# Patient Record
Sex: FEMALE | Employment: FULL TIME | URBAN - METROPOLITAN AREA
[De-identification: names, ages, dates, MRNs, and addresses within clinical notes are randomized per-mention and may not be internally consistent; named-entity substitution may affect disease eponyms.]

---

## 2017-02-10 ENCOUNTER — HOSPITAL ENCOUNTER (EMERGENCY)
Facility: CLINIC | Age: 22
Discharge: HOME | End: 2017-02-10
Attending: EMERGENCY MEDICINE | Admitting: EMERGENCY MEDICINE
Payer: COMMERCIAL

## 2017-02-10 ENCOUNTER — APPOINTMENT (OUTPATIENT)
Dept: GENERAL RADIOLOGY | Facility: CLINIC | Age: 22
End: 2017-02-10
Attending: EMERGENCY MEDICINE
Payer: COMMERCIAL

## 2017-02-10 VITALS
SYSTOLIC BLOOD PRESSURE: 151 MMHG | WEIGHT: 183 LBS | TEMPERATURE: 99 F | DIASTOLIC BLOOD PRESSURE: 67 MMHG | OXYGEN SATURATION: 98 % | HEIGHT: 62 IN | HEART RATE: 58 BPM | BODY MASS INDEX: 33.68 KG/M2 | RESPIRATION RATE: 18 BRPM

## 2017-02-10 DIAGNOSIS — S60.012A CONTUSION OF LEFT THUMB WITHOUT DAMAGE TO NAIL, INITIAL ENCOUNTER: ICD-10-CM

## 2017-02-10 PROCEDURE — 99283 EMERGENCY DEPT VISIT LOW MDM: CPT | Mod: 25

## 2017-02-10 PROCEDURE — 11740 EVACUATION SUBUNGUAL HMTMA: CPT | Mod: LT

## 2017-02-10 PROCEDURE — 73140 X-RAY EXAM OF FINGER(S): CPT | Mod: LT

## 2017-02-10 NOTE — ED AVS SNAPSHOT
Emergency Department    [address] [place] [address]    Phone:  [phone]    Fax:  [phone]                                       Mirian Vergara   MRN: [id number]    Department:   Emergency Department   Date of Visit:  2/10/2017           After Visit Summary Signature Page     I have received my discharge instructions, and my questions have been answered. I have discussed any challenges I see with this plan with the nurse or doctor.    ..........................................................................................................................................  Patient/Patient Representative Signature      ..........................................................................................................................................  Patient Representative Print Name and Relationship to Patient    ..................................................               ................................................  Date                                            Time    ..........................................................................................................................................  Reviewed by Signature/Title    ...................................................              ..............................................  Date                                                            Time

## 2017-02-10 NOTE — ED AVS SNAPSHOT
Emergency Department    [address] [facility] [address]    Phone:  [phone]    Fax:  [phone]                                       Mirian Vergara   MRN: [id number]    Department:   Emergency Department   Date of Visit:  2/10/2017           Patient Information     Date Of Birth          1995        Your diagnoses for this visit were:     Contusion of left thumb without damage to nail, initial encounter        You were seen by Mikal Clemons MD.      Follow-up Information     Follow up with Elsy Zuluaga MD.    Specialty:  Family Practice    Contact information:    [facility]  [address] [facility] [address]  [phone]          Discharge Instructions         Subungual Hematoma  A subungual hematoma is blood under the nail. It occurs when the finger or toe has been hit or crushed. It causes the nail to look blue. Sometimes, the bone under the nail may be fractured.    If the bruise is small and not too painful, it will heal without treatment. If the bruise is large and painful, the blood may need to be drained.  If a large area of the nail is damaged, your doctor may want to remove it. If the nail was not removed, it may become loose or fall off in the next 2 weeks. In almost all cases, the nail will grow back from the area under the cuticle called the matrix. This takes a few weeks to start and is complete in about 4 to 6 months for a fingernail and 12 months for a toenail. If the nail bed or matrix was damaged, the nail may grow back with a rough or irregular shape. Sometimes the nail may not regrow at all.  Home care  The following guidelines will help you care for your wound at home:    Apply an ice pack (ice cubes in a plastic bag, wrapped in a thin towel) for 20 minutes every 1 to 2 hours the first day. Continue this 3 to 4 times a day until the pain and swelling goes away.    If the nail was drained:    Keep the nail covered with a clean adhesive  bandage for the next 2 days. There may be some oozing of blood during that time, so change the bandage as needed.    Soak the finger or toe once a day under warm running water. Clean any crust away with a cotton-tipped applicator soaked in soapy water.    If the nail was removed:    The nail bed (tissue under the nail) is moist, soft and sensitive. This needs to be protected from injury for the first 7 to 10 days until it dries out and becomes hard. Keep it covered with a dressing or adhesive bandage until that time.    Bandages tend to stick to a newly exposed nail bed and can be hard to remove if left in place more than 24 hours. Therefore, unless you were told otherwise, change dressings every 24 hours. You can also use nonstick dressings. If necessary, soak the dressing off while holding your finger or toe under warm running water.    If an X-ray showed a fracture, you should protect the finger or toe for 3 to 4 weeks while it is healing.  Here is some information regarding medicine and your wound:    You can take acetaminophen or ibuprofen for pain, unless you were given a different pain medicine to use. If you have chronic liver or kidney disease or ever had a stomach ulcer or GI bleeding or are taking blood thinner medications, talk with your doctor before using these medicines.    If you were given antibiotics, take them until they are used up. It is important to finish the antibiotics even if the wound looks better. This will ensure the infection has cleared.  Follow-up care  Follow up with your doctor or this facility as advised. If the nail was drained, there is a small risk of infection. Watch carefully for the signs listed below.  When to get medical care  Get prompt medical attention if any of the following occur:    Increasing redness around the nail    Increasing local pain or swelling    Pus draining from the nail    Fever of 100.4 F (38 C) or higher, or as directed by your health care provider     1224-9739 [employer]. [address] [place] [address], [place] [address]. All rights reserved. This information is not intended as a substitute for professional medical care. Always follow your healthcare professional's instructions.          24 Hour Appointment Hotline       To make an appointment at any [facility], call [phone] ([phone]). If you don't have a family doctor or clinic, we will help you find one. [facility] are conveniently located to serve the needs of you and your family.             Review of your medicines      Our records show that you are taking the medicines listed below. If these are incorrect, please call your family doctor or clinic.        Dose / Directions Last dose taken    MIRENA (52 MG) IU        Refills:  0                Procedures and tests performed during your visit     Fingers XR, 2-3 views, left      Orders Needing Specimen Collection     None      Pending Results     No orders found from 2/9/2017 to 2/11/2017.            Pending Culture Results     No orders found from 2/9/2017 to 2/11/2017.       Test Results from your hospital stay           2/10/2017  8:17 PM - Interface, Radiant Ib      Narrative     LEFT THUMB 3 VIEWS   2/10/2017 8:00 PM     HISTORY: left thumb was slammed in car door; unable to bend thumb    COMPARISON: None.        Impression     IMPRESSION: Normal.    FREEMAN DIAZ MD                Clinical Quality Measure: Blood Pressure Screening     Your blood pressure was checked while you were in the emergency department today. The last reading we obtained was  BP: 151/67 mmHg . Please read the guidelines below about what these numbers mean and what you should do about them.  If your systolic blood pressure (the top number) is less than 120 and your diastolic blood pressure (the bottom number) is less than 80, then your blood pressure is normal. There is nothing more that you need to do about it.  If your systolic blood pressure (the  "top number) is 120-139 or your diastolic blood pressure (the bottom number) is 80-89, your blood pressure may be higher than it should be. You should have your blood pressure rechecked within a year by a primary care provider.  If your systolic blood pressure (the top number) is 140 or greater or your diastolic blood pressure (the bottom number) is 90 or greater, you may have high blood pressure. High blood pressure is treatable, but if left untreated over time it can put you at risk for heart attack, stroke, or kidney failure. You should have your blood pressure rechecked by a primary care provider within the next 4 weeks.  If your provider in the emergency department today gave you specific instructions to follow-up with your doctor or provider even sooner than that, you should follow that instruction and not wait for up to 4 weeks for your follow-up visit.        Thank you for choosing [facility]       Thank you for choosing [facility] for your care. Our goal is always to provide you with excellent care. Hearing back from our patients is one way we can continue to improve our services. Please take a few minutes to complete the written survey that you may receive in the mail after you visit with us. Thank you!        [employer] Information     [employer] lets you send messages to your doctor, view your test results, renew your prescriptions, schedule appointments and more. To sign up, go to [email or web address] . Click on \"Log in\" on the left side of the screen, which will take you to the Welcome page. Then click on \"Sign up Now\" on the right side of the page.     You will be asked to enter the access code listed below, as well as some personal information. Please follow the directions to create your username and password.     Your access code is: [email or web address]  Expires: 2017 10:38 PM     Your access code will  in 90 days. If you need help or a new code, please call your [facility] clinic or [phone].      "   Care EveryWhere ID     This is your Care EveryWhere ID. This could be used by other organizations to access your [facility] medical records  [id number]        After Visit Summary       This is your record. Keep this with you and show to your community pharmacist(s) and doctor(s) at your next visit.

## 2017-02-11 NOTE — DISCHARGE INSTRUCTIONS
Subungual Hematoma  A subungual hematoma is blood under the nail. It occurs when the finger or toe has been hit or crushed. It causes the nail to look blue. Sometimes, the bone under the nail may be fractured.    If the bruise is small and not too painful, it will heal without treatment. If the bruise is large and painful, the blood may need to be drained.  If a large area of the nail is damaged, your doctor may want to remove it. If the nail was not removed, it may become loose or fall off in the next 2 weeks. In almost all cases, the nail will grow back from the area under the cuticle called the matrix. This takes a few weeks to start and is complete in about 4 to 6 months for a fingernail and 12 months for a toenail. If the nail bed or matrix was damaged, the nail may grow back with a rough or irregular shape. Sometimes the nail may not regrow at all.  Home care  The following guidelines will help you care for your wound at home:    Apply an ice pack (ice cubes in a plastic bag, wrapped in a thin towel) for 20 minutes every 1 to 2 hours the first day. Continue this 3 to 4 times a day until the pain and swelling goes away.    If the nail was drained:    Keep the nail covered with a clean adhesive bandage for the next 2 days. There may be some oozing of blood during that time, so change the bandage as needed.    Soak the finger or toe once a day under warm running water. Clean any crust away with a cotton-tipped applicator soaked in soapy water.    If the nail was removed:    The nail bed (tissue under the nail) is moist, soft and sensitive. This needs to be protected from injury for the first 7 to 10 days until it dries out and becomes hard. Keep it covered with a dressing or adhesive bandage until that time.    Bandages tend to stick to a newly exposed nail bed and can be hard to remove if left in place more than 24 hours. Therefore, unless you were told otherwise, change dressings every 24 hours. You can also  use nonstick dressings. If necessary, soak the dressing off while holding your finger or toe under warm running water.    If an X-ray showed a fracture, you should protect the finger or toe for 3 to 4 weeks while it is healing.  Here is some information regarding medicine and your wound:    You can take acetaminophen or ibuprofen for pain, unless you were given a different pain medicine to use. If you have chronic liver or kidney disease or ever had a stomach ulcer or GI bleeding or are taking blood thinner medications, talk with your doctor before using these medicines.    If you were given antibiotics, take them until they are used up. It is important to finish the antibiotics even if the wound looks better. This will ensure the infection has cleared.  Follow-up care  Follow up with your doctor or this facility as advised. If the nail was drained, there is a small risk of infection. Watch carefully for the signs listed below.  When to get medical care  Get prompt medical attention if any of the following occur:    Increasing redness around the nail    Increasing local pain or swelling    Pus draining from the nail    Fever of 100.4 F (38 C) or higher, or as directed by your health care provider    5523-3241 [employer]. [address] [place] [address], [place] [address]. All rights reserved. This information is not intended as a substitute for professional medical care. Always follow your healthcare professional's instructions.

## 2017-02-11 NOTE — ED NOTES
Provided patient with a 2x2 gauze and tubegauze to left thumb following cautery procedure by provider. No complaints. Pre and post dressing CMS present and intact.   Bleeding controlled at this time.Patient understood the signs and symptoms of circulatory loss due to the tube gauze aplication and will take action to rectify   such loss, including but not limited to loosening the dressing and or taking it off.

## 2017-02-11 NOTE — ED PROVIDER NOTES
"  History     Chief Complaint:  Thumb pain    HPI   Mirian Vergara is a 21 year old female who presents to the emergency department today with thumb pain. 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 Pain increase with range of motion of the thumb at the DIP joint and she has limited range of motion at the DIP joint. Denies any other issues.     Allergies:  No Known Drug Allergies      Medications:    Mirena      Past Medical History:    History reviewed. No pertinent past medical history.      Past Surgical History:    History reviewed. No pertinent past surgical history.      Family History:    Diabetes - Mother - Maternal Grandfather   Hypertension - Father      Social History:  Smoking Status: Never smoker  Alcohol Use: No    Marital Status:  Single     Review of Systems   Skin:        Left thumb pain: Positive    All other systems reviewed and are negative.    Physical Exam   First Vitals:  BP: 151/67 mmHg  Pulse: 58  Temp: 99  F (37.2  C)  Resp: 18  Height: 157.5 cm (5' 2\")  Weight: 83.008 kg (183 lb)  SpO2: 98 %    Physical Exam  GENERAL: well developed, pleasant  HEAD: atraumatic  EYES: pupils reactive, extraocular muscles intact, conjunctivae normal  ENT:  mucus membranes moist  MUSCULOSKELETAL: Tenderness over the distal phalanx of the left thumb phalanx with a subungal hematoma.   SKIN: warm and dry, no acute rashes or ulceration  NEURO: GCS 15, cranial nerves intact, alert and oriented x3  PSYCH:  Mood/affect normal    Emergency Department Course     Imaging:  Radiology findings were communicated with the patient who voiced understanding of the findings.    Fingers XR, 2-3 views, left:  IMPRESSION: Normal.  Final reading per radiology     Procedures:  PROCEDURE: Nail trephination  Indication: subungal hematoma  The risks and benefits of the " procedure were discussed with the patient.   Narrative: The nail was cleaned with an alcohol swab.  An 18 gauge needle and a cautery was used to trephinate the nail.  A satisfactory amount of blood was expressed.  Pain was improved following the procedure.     Emergency Department Course:  Nursing notes and vitals reviewed.  I performed an exam of the patient as documented above.   I discussed the treatment plan with the patient. They expressed understanding of this plan and consented to discharge. They will be discharged home with instructions for care and follow up. In addition, the patient will return to the emergency department if their symptoms persist, worsen, if new symptoms arise or if there is any concern.  All questions were answered.     Impression & Plan      Medical Decision Making:  Mirian Vergara is a 21 year old female who presents to the emergency department today with subungual hematoma. It was initially trephinated by a 18 gauge needle followed by a nail trephinator followed by good outlet and relief of pain. A dressing was applied and discussed outpatient management. XR was negative.     Diagnosis:    ICD-10-CM    1. Contusion of left thumb without damage to nail, initial encounter S60.012A      Scribe Disclosure:  I, Pablo Barnett, am serving as a scribe at 9:55 PM on 2/10/2017 to document services personally performed by Mikal Clemons MD, based on my observations and the provider's statements to me.   2/10/2017    EMERGENCY DEPARTMENT         Mikal Clemons MD  02/12/17 2046

## 2017-08-26 ENCOUNTER — HEALTH MAINTENANCE LETTER (OUTPATIENT)
Age: 22
End: 2017-08-26

## 2018-07-16 ENCOUNTER — HEALTH MAINTENANCE LETTER (OUTPATIENT)
Age: 23
End: 2018-07-16

## 2018-11-05 ENCOUNTER — OFFICE VISIT (OUTPATIENT)
Dept: URGENT CARE | Facility: URGENT CARE | Age: 23
End: 2018-11-05

## 2018-11-05 VITALS
HEART RATE: 72 BPM | TEMPERATURE: 98.9 F | RESPIRATION RATE: 16 BRPM | DIASTOLIC BLOOD PRESSURE: 80 MMHG | SYSTOLIC BLOOD PRESSURE: 118 MMHG

## 2018-11-05 DIAGNOSIS — R05.8 PRODUCTIVE COUGH: Primary | ICD-10-CM

## 2018-11-05 DIAGNOSIS — R07.0 THROAT PAIN: ICD-10-CM

## 2018-11-05 LAB
DEPRECATED S PYO AG THROAT QL EIA: NORMAL
SPECIMEN SOURCE: NORMAL

## 2018-11-05 PROCEDURE — 99203 OFFICE O/P NEW LOW 30 MIN: CPT | Performed by: FAMILY MEDICINE

## 2018-11-05 PROCEDURE — 87880 STREP A ASSAY W/OPTIC: CPT | Performed by: FAMILY MEDICINE

## 2018-11-05 PROCEDURE — 87081 CULTURE SCREEN ONLY: CPT | Performed by: FAMILY MEDICINE

## 2018-11-05 RX ORDER — CODEINE PHOSPHATE AND GUAIFENESIN 10; 100 MG/5ML; MG/5ML
1 SOLUTION ORAL EVERY 4 HOURS PRN
Qty: 120 ML | Refills: 0 | Status: SHIPPED | OUTPATIENT
Start: 2018-11-05 | End: 2019-05-12

## 2018-11-05 NOTE — MR AVS SNAPSHOT
"              After Visit Summary   2018    Mirian Vergara    MRN: [id number]           Patient Information     Date Of Birth          1995        Visit Information        Provider Department      2018 12:30 PM Olinda Larry MD [facility]        Today's Diagnoses     Productive cough    -  1    Throat pain           Follow-ups after your visit        Who to contact     If you have questions or need follow up information about today's clinic visit or your schedule please contact [facility] directly at [phone].  Normal or non-critical lab and imaging results will be communicated to you by [employer], letter or phone within 4 business days after the clinic has received the results. If you do not hear from us within 7 days, please contact the clinic through [employer] or phone. If you have a critical or abnormal lab result, we will notify you by phone as soon as possible.  Submit refill requests through [employer] or call your pharmacy and they will forward the refill request to us. Please allow 3 business days for your refill to be completed.          Additional Information About Your Visit        MyChart Information     [employer] lets you send messages to your doctor, view your test results, renew your prescriptions, schedule appointments and more. To sign up, go to [email or web address] . Click on \"Log in\" on the left side of the screen, which will take you to the Welcome page. Then click on \"Sign up Now\" on the right side of the page.     You will be asked to enter the access code listed below, as well as some personal information. Please follow the directions to create your username and password.     Your access code is: [email or web address]  Expires: 2/3/2019 12:53 PM     Your access code will  in 90 days. If you need help or a new code, please call your [facility] clinic or [phone].        Care EveryWhere ID     This is your Care EveryWhere " ID. This could be used by other organizations to access your [facility] medical records  [id number]        Your Vitals Were     Pulse Temperature Respirations             72 98.9  F (37.2  C) (Oral) 16          Blood Pressure from Last 3 Encounters:   11/05/18 118/80   02/10/17 151/67   09/22/14 113/75    Weight from Last 3 Encounters:   02/10/17 183 lb (83 kg) (95 %)*   12/22/11 166 lb 2 oz (75.4 kg) (95 %)*     * Growth percentiles are based on [facility] 2-20 Years data.              We Performed the Following     Beta strep group A culture     Strep, Rapid Screen          Today's Medication Changes          These changes are accurate as of 11/5/18 12:53 PM.  If you have any questions, ask your nurse or doctor.               Start taking these medicines.        Dose/Directions    guaiFENesin-codeine 100-10 MG/5ML Soln solution   Commonly known as:  ROBITUSSIN AC   Used for:  Productive cough   Started by:  Olinda Larry MD        Dose:  1 tsp.   Take 5 mLs by mouth every 4 hours as needed for cough   Quantity:  120 mL   Refills:  0            Where to get your medicines      Some of these will need a paper prescription and others can be bought over the counter.  Ask your nurse if you have questions.     Bring a paper prescription for each of these medications     guaiFENesin-codeine 100-10 MG/5ML Soln solution                Primary Care Provider Office Phone # Fax #    Elsy Aminah Zuluaga [phone]       [facility] [address] [facility] [address]        Equal Access to Services     ALEJANDRO LAW AH: Hadii oral helms hadasho Soomaali, waaxda luqadaha, qaybta kaalmada adeegyada, santana dixon. So [facility] [phone].    ATENCIÓN: Si habla español, tiene a mayo disposición servicios gratuitos de asistencia lingüística. Llame al [phone].    We comply with applicable federal civil rights laws and [place] laws. We do not discriminate on the basis of race, color,  national origin, age, disability, sex, sexual orientation, or gender identity.            Thank you!     Thank you for choosing [facility]  for your care. Our goal is always to provide you with excellent care. Hearing back from our patients is one way we can continue to improve our services. Please take a few minutes to complete the written survey that you may receive in the mail after your visit with us. Thank you!             Your Updated Medication List - Protect others around you: Learn how to safely use, store and throw away your medicines at www.disposemymeds.org.          This list is accurate as of 11/5/18 12:53 PM.  Always use your most recent med list.                   Brand Name Dispense Instructions for use Diagnosis    guaiFENesin-codeine 100-10 MG/5ML Soln solution    ROBITUSSIN AC    120 mL    Take 5 mLs by mouth every 4 hours as needed for cough    Productive cough       MIRENA (52 MG) IU           sertraline 50 MG tablet    ZOLOFT     Take 50 mg by mouth daily

## 2018-11-05 NOTE — PROGRESS NOTES
SUBJECTIVE:   Mirian Vergara is a 23 year old female presenting with a chief complaint of runny nose, stuffy nose, cough - productive and sore throat. She is an established patient of [facility].  Onset of symptoms was 7 day(s) ago.  Course of illness is worsening.    Severity moderate  Current and Associated symptoms: cough - productive and sore throat  Treatment measures tried include OTC Cough med.  Predisposing factors include None.    No past medical history on file.  Current Outpatient Prescriptions   Medication Sig Dispense Refill     guaiFENesin-codeine (ROBITUSSIN AC) 100-10 MG/5ML SOLN solution Take 5 mLs by mouth every 4 hours as needed for cough 120 mL 0     Levonorgestrel (MIRENA, 52 MG, IU)        sertraline (ZOLOFT) 50 MG tablet Take 50 mg by mouth daily       Social History   Substance Use Topics     Smoking status: Never Smoker     Smokeless tobacco: Never Used     Alcohol use No     Family History   Problem Relation Age of Onset     Diabetes Mother      Hypertension Father      Diabetes Maternal Grandfather          ROS:    10 point ROS of systems including Constitutional, Eyes,  Cardiovascular, Gastroenterology, Genitourinary, Integumentary, Muscularskeletal, Psychiatric were all negative except for pertinent positives noted in my HPI         OBJECTIVE:  /80  Pulse 72  Temp 98.9  F (37.2  C) (Oral)  Resp 16  GENERAL APPEARANCE: healthy, alert and no distress  EYES: EOMI,  PERRL, conjunctiva clear  HENT: ear canals and TM's normal.  Nose and mouth without ulcers, erythema or lesions  NECK: supple, nontender, no lymphadenopathy  RESP: lungs clear to auscultation - no rales, rhonchi or wheezes  CV: regular rates and rhythm, normal S1 S2, no murmur noted  ABDOMEN:  soft, nontender, no HSM or masses and bowel sounds normal  SKIN: no suspicious lesions or rashes  Physical Exam      X-Ray was not done.      ASSESSMENT:  Mirian was seen today for urgent care.    Diagnoses and all orders for this  visit:    Productive cough  -     guaiFENesin-codeine (ROBITUSSIN AC) 100-10 MG/5ML SOLN solution; Take 5 mLs by mouth every 4 hours as needed for cough    Throat pain  -     Strep, Rapid Screen  -     Beta strep group A culture        Results for orders placed or performed in visit on 11/05/18   Strep, Rapid Screen   Result Value Ref Range    Specimen Description Throat     Rapid Strep A Screen       NEGATIVE: No Group A streptococcal antigen detected by immunoassay, await culture report.       PLAN:  Tylenol, Ibuprofen, Fluids, Rest, Saline gargles, Saline nasal spray and Vaporizer  See orders in Epic  Advised not to drive when taking the cough meds   Follow up if  symptoms fail to improve or worsens   Pt understood and agreed with plan     Olinda Larry MD

## 2018-11-06 LAB
BACTERIA SPEC CULT: NORMAL
SPECIMEN SOURCE: NORMAL

## 2019-05-10 ENCOUNTER — OFFICE VISIT (OUTPATIENT)
Dept: URGENT CARE | Facility: URGENT CARE | Age: 24
End: 2019-05-10

## 2019-05-10 ENCOUNTER — ANCILLARY PROCEDURE (OUTPATIENT)
Dept: GENERAL RADIOLOGY | Facility: CLINIC | Age: 24
End: 2019-05-10
Attending: FAMILY MEDICINE

## 2019-05-10 VITALS
TEMPERATURE: 102.3 F | DIASTOLIC BLOOD PRESSURE: 74 MMHG | OXYGEN SATURATION: 97 % | SYSTOLIC BLOOD PRESSURE: 118 MMHG | HEART RATE: 108 BPM | BODY MASS INDEX: 35.67 KG/M2 | RESPIRATION RATE: 16 BRPM | WEIGHT: 195 LBS

## 2019-05-10 DIAGNOSIS — R05.8 PRODUCTIVE COUGH: Primary | ICD-10-CM

## 2019-05-10 DIAGNOSIS — J02.9 SORETHROAT: ICD-10-CM

## 2019-05-10 DIAGNOSIS — R50.9 FEVER IN ADULT: ICD-10-CM

## 2019-05-10 DIAGNOSIS — J18.9 PNEUMONIA OF RIGHT LOWER LOBE DUE TO INFECTIOUS ORGANISM: ICD-10-CM

## 2019-05-10 LAB
DEPRECATED S PYO AG THROAT QL EIA: NORMAL
SPECIMEN SOURCE: NORMAL

## 2019-05-10 PROCEDURE — 87081 CULTURE SCREEN ONLY: CPT | Performed by: FAMILY MEDICINE

## 2019-05-10 PROCEDURE — 99214 OFFICE O/P EST MOD 30 MIN: CPT | Performed by: FAMILY MEDICINE

## 2019-05-10 PROCEDURE — 87880 STREP A ASSAY W/OPTIC: CPT | Performed by: FAMILY MEDICINE

## 2019-05-10 PROCEDURE — 71046 X-RAY EXAM CHEST 2 VIEWS: CPT

## 2019-05-10 RX ORDER — BENZONATATE 100 MG/1
100 CAPSULE ORAL 3 TIMES DAILY PRN
Qty: 30 CAPSULE | Refills: 0 | Status: SHIPPED | OUTPATIENT
Start: 2019-05-10

## 2019-05-10 RX ORDER — AZITHROMYCIN 250 MG/1
TABLET, FILM COATED ORAL
Qty: 6 TABLET | Refills: 0 | Status: SHIPPED | OUTPATIENT
Start: 2019-05-10 | End: 2019-05-15

## 2019-05-10 NOTE — PROGRESS NOTES
SUBJECTIVE:   Mirian Vergara is a 23 year old female with h/o inactive TB   presenting with a chief complaint of fever, chills, cough - productive and fatigue.and sorethroat  She is an established patient of [facility].  Onset of symptoms was 5 day(s) ago.  Course of illness is worsening.    Severity moderate  Current and Associated symptoms: fever, chills and cough - productive  Treatment measures tried include OTC Cough med.  Predisposing factors include recent illness it all started almost a week back with sore throat runny nose and fever then it resolved completely but then 5 days back she started spiking high fever with cough productive of mostly mucus.  And chills.    History reviewed. No pertinent past medical history.  Current Outpatient Medications   Medication Sig Dispense Refill     guaiFENesin-codeine (ROBITUSSIN AC) 100-10 MG/5ML SOLN solution Take 5 mLs by mouth every 4 hours as needed for cough (Patient not taking: Reported on 5/10/2019) 120 mL 0     Levonorgestrel (MIRENA, 52 MG, IU)        sertraline (ZOLOFT) 50 MG tablet Take 50 mg by mouth daily       Social History     Tobacco Use     Smoking status: Never Smoker     Smokeless tobacco: Never Used   Substance Use Topics     Alcohol use: No     Family History   Problem Relation Age of Onset     Diabetes Mother      Hypertension Father      Diabetes Maternal Grandfather          ROS:    10 point ROS of systems including Eyes,Cardiovascular, Gastroenterology, Genitourinary, Integumentary, Muscularskeletal, Psychiatric were all negative except for pertinent positives noted in my HPI         OBJECTIVE:  /74 (BP Location: Right arm, Patient Position: Sitting, Cuff Size: Adult Regular)   Pulse 108   Temp 102.3  F (39.1  C) (Oral)   Resp 16   Wt 88.5 kg (195 lb)   SpO2 97%   BMI 35.67 kg/m    GENERAL APPEARANCE: healthy, alert and no distress  EYES: EOMI,  PERRL, conjunctiva clear  HENT: ear canals and TM's normal.  Nose and mouth without  ulcers, erythema or lesions  NECK: supple, nontender, no lymphadenopathy  RESP: lungs positive for  rales, rhonchi or wheezes  CV: regular rates and rhythm, normal S1 S2, no murmur noted  ABDOMEN:  soft, nontender, no HSM or masses and bowel sounds normal  SKIN: no suspicious lesions or rashes  PSYCH: mentation appears normal  Physical Exam      X-Ray was done, my findings are:  Right lower lobe infiltrate noted    Medical Decision Making:    Differential Diagnosis:  URI Adult/Peds:  Bronchitis-viral, Influenza, Pneumonia, Strep pharyngitis, Viral pharyngitis, Viral syndrome and Viral upper respiratory illness      ASSESSMENT:  Mirian was seen today for fever and cough.    Diagnoses and all orders for this visit:    Productive cough  -     XR Chest 2 Views    Pneumonia of right lower lobe due to infectious organism (H)  -     azithromycin (ZITHROMAX) 250 MG tablet; Take 2 tablets (500 mg) by mouth daily for 1 day, THEN 1 tablet (250 mg) daily for 4 days.    Fever in adult  -     XR Chest 2 Views  -     Strep, Rapid Screen    Sorethroat  -     Strep, Rapid Screen          PLAN:  Tylenol, Fluids, Rest, Saline gargles, Saline nasal spray and Vaporizer  Will treat with azithromycin  Follow up if  symptoms fail to improve or worsens   Pt understood and agreed with plan   Pt was advised to follow up in ER if symptoms worsen     See orders in Epic

## 2019-05-11 LAB
BACTERIA SPEC CULT: NORMAL
SPECIMEN SOURCE: NORMAL

## 2019-05-12 ENCOUNTER — APPOINTMENT (OUTPATIENT)
Dept: GENERAL RADIOLOGY | Facility: CLINIC | Age: 24
End: 2019-05-12
Attending: EMERGENCY MEDICINE

## 2019-05-12 ENCOUNTER — HOSPITAL ENCOUNTER (EMERGENCY)
Facility: CLINIC | Age: 24
Discharge: HOME | End: 2019-05-13
Attending: EMERGENCY MEDICINE | Admitting: EMERGENCY MEDICINE

## 2019-05-12 DIAGNOSIS — R05.9 COUGH: ICD-10-CM

## 2019-05-12 PROCEDURE — 71046 X-RAY EXAM CHEST 2 VIEWS: CPT

## 2019-05-12 PROCEDURE — 99283 EMERGENCY DEPT VISIT LOW MDM: CPT | Mod: 25

## 2019-05-12 PROCEDURE — 25000125 ZZHC RX 250: Performed by: EMERGENCY MEDICINE

## 2019-05-12 PROCEDURE — 94640 AIRWAY INHALATION TREATMENT: CPT

## 2019-05-12 RX ORDER — ALBUTEROL SULFATE 90 UG/1
2 AEROSOL, METERED RESPIRATORY (INHALATION) EVERY 4 HOURS PRN
Qty: 6.7 G | Refills: 0 | Status: SHIPPED | OUTPATIENT
Start: 2019-05-12

## 2019-05-12 RX ORDER — DEXAMETHASONE SODIUM PHOSPHATE 10 MG/ML
16 INJECTION, SOLUTION INTRAMUSCULAR; INTRAVENOUS ONCE
Status: DISCONTINUED | OUTPATIENT
Start: 2019-05-12 | End: 2019-05-13 | Stop reason: HOSPADM

## 2019-05-12 RX ORDER — IPRATROPIUM BROMIDE AND ALBUTEROL SULFATE 2.5; .5 MG/3ML; MG/3ML
3 SOLUTION RESPIRATORY (INHALATION) ONCE
Status: COMPLETED | OUTPATIENT
Start: 2019-05-12 | End: 2019-05-12

## 2019-05-12 RX ORDER — CODEINE PHOSPHATE AND GUAIFENESIN 10; 100 MG/5ML; MG/5ML
1 SOLUTION ORAL EVERY 4 HOURS PRN
Qty: 118 ML | Refills: 0 | Status: SHIPPED | OUTPATIENT
Start: 2019-05-12

## 2019-05-12 RX ADMIN — IPRATROPIUM BROMIDE AND ALBUTEROL SULFATE 3 ML: .5; 3 SOLUTION RESPIRATORY (INHALATION) at 22:33

## 2019-05-12 ASSESSMENT — ENCOUNTER SYMPTOMS
RHINORRHEA: 1
COUGH: 1
DIARRHEA: 0
FEVER: 0
WHEEZING: 1

## 2019-05-12 ASSESSMENT — MIFFLIN-ST. JEOR: SCORE: 1602.76

## 2019-05-12 NOTE — ED AVS SNAPSHOT
Emergency Department  [address] [place] [id number]  Phone:  [phone]  Fax:  [phone]                                    Mirian Vergara   MRN: [id number]    Department:   Emergency Department   Date of Visit:  5/12/2019           After Visit Summary Signature Page    I have received my discharge instructions, and my questions have been answered. I have discussed any challenges I see with this plan with the nurse or doctor.    ..........................................................................................................................................  Patient/Patient Representative Signature      ..........................................................................................................................................  Patient Representative Print Name and Relationship to Patient    ..................................................               ................................................  Date                                   Time    ..........................................................................................................................................  Reviewed by Signature/Title    ...................................................              ..............................................  Date                                               Time          22EPIC Rev 08/18

## 2019-05-13 VITALS
WEIGHT: 195 LBS | BODY MASS INDEX: 35.88 KG/M2 | DIASTOLIC BLOOD PRESSURE: 70 MMHG | HEART RATE: 83 BPM | TEMPERATURE: 98.3 F | OXYGEN SATURATION: 98 % | RESPIRATION RATE: 20 BRPM | HEIGHT: 62 IN | SYSTOLIC BLOOD PRESSURE: 110 MMHG

## 2019-05-13 NOTE — DISCHARGE INSTRUCTIONS
Use inhaler as needed when feeling short of breath or wheezing  Tessalon pearls during day, robitussen at night  Complete antibiotics as prescribed    Discharge Instructions  Bronchitis, Pneumonia, Bronchospasm    You were seen today for a chest infection or inflammation. If your provider decided this was due to a bacterial infection, you may need an antibiotic. Sometimes these are caused by a virus, and then an antibiotic will not help.     Generally, every Emergency Department visit should have a follow-up clinic visit with either a primary or a specialty clinic/provider. Please follow-up as instructed by your emergency provider today.    Return to the Emergency Department if:  Your breathing gets much worse.  You are very weak, or feel much more ill.  You develop new symptoms, such as chest pain.  You cough up blood.  You are vomiting (throwing up) enough that you cannot keep fluids or your medicine down.    What can I do to help myself?  Fill any prescriptions the provider gave you and take them right away--especially antibiotics. Be sure to finish the whole antibiotic prescription.  You may be given a prescription for an inhaler, which can help loosen tight air passages.  Use this as needed, but not more often than directed. Inhalers work much better when used with a spacer.   You may be given a prescription for a steroid to reduce inflammation. Used long-term, these can have side effects, but for short-term use they are safe. You may notice restlessness or increased appetite.      You may use non-prescription cough or cold medicines. Cough medicines may help, but don?t make the cough go away completely.   Avoid smoke, because this can make your symptoms worse. If you smoke, this may be a good time to quit! Consider using nicotine lozenges, gum, or patches to reduce cravings.   If you have a fever, Tylenol  (acetaminophen), Motrin  (ibuprofen), or Advil  (ibuprofen) may help bring fever down and may help you  feel more comfortable. Be sure to read and follow the package directions, and ask your provider if you have questions.  Be sure to get your flu shot each year.  For certain ages, the pneumonia shot can help prevent pneumonia.  If you were given a prescription for medicine here today, be sure to read all of the information (including the package insert) that comes with your prescription.  This will include important information about the medicine, its side effects, and any warnings that you need to know about.  The pharmacist who fills the prescription can provide more information and answer questions you may have about the medicine.  If you have questions or concerns that the pharmacist cannot address, please call or return to the Emergency Department.     Remember that you can always come back to the Emergency Department if you are not able to see your regular provider in the amount of time listed above, if you get any new symptoms, or if there is anything that worries you.

## 2019-05-13 NOTE — ED PROVIDER NOTES
History     Chief Complaint:  Cough    HPI   Mirian Vergara is a 23 year old female who presents to the emergency department today for evaluation of cough. Per chart review, patient went to Urgent Care 2 days ago after having chills, fever, productive cough, and sore throat for the past 5 days. Chest X-ray was performed (results below), and discharged home with Tessalon pearls and azithromycin. Today, patient states her cough is not getting any better and when she takes the tessalon pearls, it burns her throat and exacerbates her cough. She endorses that her mother noticed that she was wheezing today. Additionally, the patient reports some episodes of emesis, however, it only happens when she is coughing too much. She endorses the breathing treatments helped her, and she is now able to take deep breaths. Her rhinorrhea is getting better, but is still slightly there. Patient denies fever, diarrhea, congestion, or history of blood clots.    Chest Two Views 5/10/19:  No radiographic evidence of acute chest abnormality.     Allergies:  No Known Drug Allergies    Medications:    Azithromycin   Tessalon  Levonorgestrel  Sertraline    Past Medical History:    Past medical history reviewed. No pertinent medical history.    Past Surgical History:    Surgical history reviewed. No pertinent surgical history.    Family History:    Mother: diabetes  Father: hypertension    Social History:  The patient was accompanied to the ED by her.  Smoking Status: Never Smoker  Smokeless Tobacco: Never Used  Alcohol Use: Negative  Drug Use: Negative  PCP: Elsy Zuluaga  Marital Status:  Single     Review of Systems   Constitutional: Negative for fever.   HENT: Positive for rhinorrhea. Negative for congestion.    Respiratory: Positive for cough and wheezing.    Gastrointestinal: Negative for diarrhea.   All other systems reviewed and are negative.    Physical Exam     Patient Vitals for the past 24 hrs:   BP Temp Temp src Heart Rate  "Resp SpO2 Height Weight   05/12/19 2221 122/70 98.3  F (36.8  C) Oral 100 16 98 % 1.575 m (5' 2\") 88.5 kg (195 lb)       Physical Exam  General: Resting comfortably on the gurney  Eyes:  The pupils are equal and round    Conjunctivae and sclerae are normal  ENT:    Moist mucous membranes, TM clear bilaterally, oropharynx clear  Neck:  Normal range of motion  CV:  Regular rate and rhythm    Skin warm and well perfused   Resp:  Lungs are clear    Non-labored    No rales    No wheezing   MS:  Normal muscular tone  Skin:  No rash or acute skin lesions noted  Neuro:   Awake, alert.      Speech is normal and fluent.    Face is symmetric.     Moves all extremities equally  Psych: Normal affect.  Appropriate interactions.    Emergency Department Course     Imaging:  Radiology findings were communicated with the patient who voiced understanding of the findings.    Chest XR,  PA & LAT   Final Result   IMPRESSION: Clear lungs.      AKIRA GOMES MD        Interventions:  2233 Duoneb 3 ml nebulization    Emergency Department Course:    2225 Nursing notes and vitals reviewed.    2240 The patient was sent for a chest XR while in the emergency department, results above.     2249 I performed an exam of the patient as documented above.     2300 Patient rechecked and updated.     0019 I personally reviewed the image results with the patient and answered all related questions prior to discharge.    Impression & Plan      Medical Decision Making:  Mirian Vergara is a 24 year old female who presents for evaluation of cough.  This is consistent with an upper respiratory tract infection.  There is no signs at this point of serious bacterial infection such as OM, RPA, epiglottitis, PTA, strep pharyngitis, pneumonia, sinusitis, meningitis, bacteremia, serious bacterial infection. Given that she was seen at urgent care 2 days ago and her cough is getting worse, I did a CXR to eval for pneumonia which was unremarkable. There are no " gastrointestinal symptoms at this point and no signs of dehydration. I have prescribed her an albuterol inhaler and robitussin for her cough. Decadron given in ED. Close followup with primary care physician is indicated.  Return to ED for fever > 103, protracted vomiting, confusion.      Diagnosis:    ICD-10-CM    1. Cough R05      Disposition:   The patient is discharged to home.    Discharge Medications:     START taking      Dose / Directions   albuterol 108 (90 Base) MCG/ACT inhaler  Commonly known as:  PROAIR HFA/PROVENTIL HFA/VENTOLIN HFA      Dose:  2 puff  Inhale 2 puffs into the lungs every 4 hours as needed for shortness of breath / dyspnea or wheezing  Quantity:  6.7 g  Refills:  0          Dose / Directions   guaiFENesin-codeine 100-10 MG/5ML solution  Commonly known as:  ROBITUSSIN AC      Dose:  1 tsp.  Take 5 mLs by mouth every 4 hours as needed for cough  Quantity:  118 mL  Refills:  0           Where to get your medicines      Some of these will need a paper prescription and others can be bought over the counter. Ask your nurse if you have questions.    Bring a paper prescription for each of these medications    albuterol 108 (90 Base) MCG/ACT inhaler    guaiFENesin-codeine 100-10 MG/5ML solution         Scribe Disclosure:  I, Kaylah Westfall, am serving as a scribe at 10:46 PM on 5/12/2019 to document services personally performed by Sandy Gu MD based on my observations and the provider's statements to me.     EMERGENCY DEPARTMENT       Sandy Gu MD  05/13/19 [id number]